# Patient Record
Sex: MALE | Race: WHITE | NOT HISPANIC OR LATINO | Employment: UNEMPLOYED | URBAN - NONMETROPOLITAN AREA
[De-identification: names, ages, dates, MRNs, and addresses within clinical notes are randomized per-mention and may not be internally consistent; named-entity substitution may affect disease eponyms.]

---
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
## 2019-02-24 ENCOUNTER — OFFICE VISIT (OUTPATIENT)
Dept: RETAIL CLINIC | Facility: CLINIC | Age: 19
End: 2019-02-24

## 2019-02-24 VITALS
DIASTOLIC BLOOD PRESSURE: 73 MMHG | SYSTOLIC BLOOD PRESSURE: 118 MMHG | BODY MASS INDEX: 23.18 KG/M2 | TEMPERATURE: 98.1 F | OXYGEN SATURATION: 97 % | WEIGHT: 166.2 LBS | HEART RATE: 67 BPM | RESPIRATION RATE: 18 BRPM

## 2019-02-24 DIAGNOSIS — R05.9 COUGH: ICD-10-CM

## 2019-02-24 DIAGNOSIS — J06.9 URI, ACUTE: Primary | ICD-10-CM

## 2019-02-24 LAB
EXPIRATION DATE: NORMAL
FLUAV AG NPH QL: NEGATIVE
FLUBV AG NPH QL: NEGATIVE
INTERNAL CONTROL: NORMAL
Lab: NORMAL

## 2019-02-24 PROCEDURE — 99213 OFFICE O/P EST LOW 20 MIN: CPT | Performed by: NURSE PRACTITIONER

## 2019-02-24 PROCEDURE — 87804 INFLUENZA ASSAY W/OPTIC: CPT | Performed by: NURSE PRACTITIONER

## 2019-02-24 RX ORDER — PREDNISONE 20 MG/1
20 TABLET ORAL DAILY
Qty: 5 TABLET | Refills: 0 | Status: SHIPPED | OUTPATIENT
Start: 2019-02-24 | End: 2019-03-01

## 2019-02-24 RX ORDER — DEXTROMETHORPHAN HYDROBROMIDE AND PROMETHAZINE HYDROCHLORIDE 15; 6.25 MG/5ML; MG/5ML
5 SYRUP ORAL EVERY 6 HOURS PRN
Qty: 100 ML | Refills: 0 | Status: SHIPPED | OUTPATIENT
Start: 2019-02-24 | End: 2019-03-01

## 2019-02-24 NOTE — PATIENT INSTRUCTIONS
Viral Respiratory Infection  A viral respiratory infection is an illness that affects parts of the body used for breathing, like the lungs, nose, and throat. It is caused by a germ called a virus.  Some examples of this kind of infection are:  · A cold.  · The flu (influenza).  · A respiratory syncytial virus (RSV) infection.    How do I know if I have this infection?  Most of the time this infection causes:  · A stuffy or runny nose.  · Yellow or green fluid in the nose.  · A cough.  · Sneezing.  · Tiredness (fatigue).  · Achy muscles.  · A sore throat.  · Sweating or chills.  · A fever.  · A headache.    How is this infection treated?  If the flu is diagnosed early, it may be treated with an antiviral medicine. This medicine shortens the length of time a person has symptoms. Symptoms may be treated with over-the-counter and prescription medicines, such as:  · Expectorants. These make it easier to cough up mucus.  · Decongestant nasal sprays.    Doctors do not prescribe antibiotic medicines for viral infections. They do not work with this kind of infection.  How do I know if I should stay home?  To keep others from getting sick, stay home if you have:  · A fever.  · A lasting cough.  · A sore throat.  · A runny nose.  · Sneezing.  · Muscles aches.  · Headaches.  · Tiredness.  · Weakness.  · Chills.  · Sweating.  · An upset stomach (nausea).    Follow these instructions at home:  · Rest as much as possible.  · Take over-the-counter and prescription medicines only as told by your doctor.  · Drink enough fluid to keep your pee (urine) clear or pale yellow.  · Gargle with salt water. Do this 3-4 times per day or as needed. To make a salt-water mixture, dissolve ½-1 tsp of salt in 1 cup of warm water. Make sure the salt dissolves all the way.  · Use nose drops made from salt water. This helps with stuffiness (congestion). It also helps soften the skin around your nose.  · Do not drink alcohol.  · Do not use tobacco  products, including cigarettes, chewing tobacco, and e-cigarettes. If you need help quitting, ask your doctor.  Get help if:  · Your symptoms last for 10 days or longer.  · Your symptoms get worse over time.  · You have a fever.  · You have very bad pain in your face or forehead.  · Parts of your jaw or neck become very swollen.  Get help right away if:  · You feel pain or pressure in your chest.  · You have shortness of breath.  · You faint or feel like you will faint.  · You keep throwing up (vomiting).  · You feel confused.  This information is not intended to replace advice given to you by your health care provider. Make sure you discuss any questions you have with your health care provider.  Document Released: 11/30/2009 Document Revised: 05/25/2017 Document Reviewed: 05/25/2016  [employer] Interactive Patient Education © 2018 Elsevier Inc.

## 2019-02-24 NOTE — PROGRESS NOTES
ROCIO@  Nash Cruz is a 19 y.o. male.   Chief Complaint   Patient presents with   • URI     rhinitis, cough with PND      URI    This is a new problem. Episode onset: 4 days. The problem has been waxing and waning. Associated symptoms include coughing (with PND), a plugged ear sensation, rhinorrhea, sinus pain, sneezing and a sore throat (days ago and resolved). Pertinent negatives include no abdominal pain, congestion, diarrhea, ear pain, headaches, nausea, rash, swollen glands, vomiting or wheezing. Treatments tried: OTC cold and cough medications. The treatment provided mild relief.      The following portions of the patient's history were reviewed and updated as appropriate: allergies, current medications, past family history, past medical history, past social history, past surgical history and problem list.    Current Outpatient Medications:   •  Atomoxetine HCl (STRATTERA PO), Take  by mouth., Disp: , Rfl:   •  ibuprofen (ADVIL,MOTRIN) 800 MG tablet, Take 1 tablet by mouth Every 6 (Six) Hours As Needed for mild pain (1-3)., Disp: 30 tablet, Rfl: 0  •  loratadine-pseudoephedrine (CLARITIN-D 12 HOUR) 5-120 MG per 12 hr tablet, Take 1 tablet by mouth 2 (Two) Times a Day., Disp: 14 tablet, Rfl: 0  •  predniSONE (DELTASONE) 20 MG tablet, Take 1 tablet by mouth Daily for 5 days., Disp: 5 tablet, Rfl: 0  •  promethazine-dextromethorphan (PROMETHAZINE-DM) 6.25-15 MG/5ML syrup, Take 5 mL by mouth Every 6 (Six) Hours As Needed for Cough for up to 5 days., Disp: 100 mL, Rfl: 0    No Known Allergies    Review of Systems   Constitutional: Negative for appetite change, chills and fever.   HENT: Positive for postnasal drip, rhinorrhea, sinus pain, sneezing and sore throat (days ago and resolved). Negative for congestion, ear pain, sinus pressure, trouble swallowing and voice change.    Respiratory: Positive for cough (with PND). Negative for chest tightness, shortness of breath and wheezing.    Gastrointestinal:  Negative for abdominal pain, diarrhea, nausea and vomiting.   Skin: Negative for color change, pallor and rash.   Allergic/Immunologic: Negative for immunocompromised state.   Neurological: Negative for dizziness and headaches.   Psychiatric/Behavioral: Negative for sleep disturbance.     Objective     Visit Vitals  /73   Pulse 67   Temp 98.1 °F (36.7 °C)   Resp 18   Wt 75.4 kg (166 lb 3.2 oz)   SpO2 97%   BMI 23.18 kg/m²     Physical Exam   Constitutional: He is oriented to person, place, and time. He appears well-developed and well-nourished.   HENT:   Head: Normocephalic.   Right Ear: Tympanic membrane is bulging. Tympanic membrane is not injected, not perforated and not erythematous.   Left Ear: Tympanic membrane is bulging. Tympanic membrane is not injected, not perforated and not erythematous.   Nose: Mucosal edema and rhinorrhea present.   Mouth/Throat: Mucous membranes are normal. No uvula swelling. Posterior oropharyngeal erythema present. No posterior oropharyngeal edema. Tonsils are 1+ on the right. Tonsils are 1+ on the left. No tonsillar exudate.   Eyes: Conjunctivae are normal.   Cardiovascular: Regular rhythm.   Pulmonary/Chest: Effort normal and breath sounds normal. He has no wheezes.   Musculoskeletal: Normal range of motion.   Lymphadenopathy:     He has no cervical adenopathy.   Neurological: He is alert and oriented to person, place, and time.   Skin: Skin is warm and dry.   Psychiatric: He has a normal mood and affect. His behavior is normal.     Lab Results (last 24 hours)     Procedure Component Value Units Date/Time    POC Influenza A / B [[id number]]  (Normal) Collected:  02/24/19 1700    Specimen:  Swab Updated:  02/24/19 1701     Rapid Influenza A Ag Negative     Rapid Influenza B Ag Negative     Internal Control Passed     Lot Number [id number]     Expiration Date 9/30/20        Assessment/Plan   Nash was seen today for uri.    Diagnoses and all orders for this visit:    URI,  acute  -     predniSONE (DELTASONE) 20 MG tablet; Take 1 tablet by mouth Daily for 5 days.  -     promethazine-dextromethorphan (PROMETHAZINE-DM) 6.25-15 MG/5ML syrup; Take 5 mL by mouth Every 6 (Six) Hours As Needed for Cough for up to 5 days.    Cough  -     POC Influenza A / B               Discussed results of the POC flu screen negative. Discussed impression and treatment. Patient to hold the Strattera while taking the above prescribed medications. Reports taking Strattera while in school only. AVS reviewed with patient, understanding verbalized and agrees with treatment plan.  Follow up with primary care provider if no improvement within next 7-10 days. Go to [facility] or ER if condition worsens.

## 2021-03-29 ENCOUNTER — HOSPITAL ENCOUNTER (EMERGENCY)
Facility: HOSPITAL | Age: 21
Discharge: HOME | End: 2021-03-29
Attending: FAMILY MEDICINE | Admitting: FAMILY MEDICINE

## 2021-03-29 ENCOUNTER — APPOINTMENT (OUTPATIENT)
Dept: CT IMAGING | Facility: HOSPITAL | Age: 21
End: 2021-03-29

## 2021-03-29 VITALS
SYSTOLIC BLOOD PRESSURE: 139 MMHG | HEIGHT: 72 IN | DIASTOLIC BLOOD PRESSURE: 89 MMHG | HEART RATE: 58 BPM | WEIGHT: 170 LBS | BODY MASS INDEX: 23.03 KG/M2 | RESPIRATION RATE: 16 BRPM | TEMPERATURE: 97.8 F | OXYGEN SATURATION: 98 %

## 2021-03-29 DIAGNOSIS — M43.16 SPONDYLOLISTHESIS OF LUMBAR REGION: Primary | ICD-10-CM

## 2021-03-29 DIAGNOSIS — S39.012A BACK STRAIN, INITIAL ENCOUNTER: ICD-10-CM

## 2021-03-29 PROCEDURE — 99283 EMERGENCY DEPT VISIT LOW MDM: CPT

## 2021-03-29 PROCEDURE — 72131 CT LUMBAR SPINE W/O DYE: CPT

## 2021-03-29 PROCEDURE — 63710000001 PREDNISONE PER 1 MG: Performed by: PHYSICIAN ASSISTANT

## 2021-03-29 PROCEDURE — 72131 CT LUMBAR SPINE W/O DYE: CPT | Performed by: RADIOLOGY

## 2021-03-29 RX ORDER — PREDNISONE 20 MG/1
20 TABLET ORAL ONCE
Status: COMPLETED | OUTPATIENT
Start: 2021-03-29 | End: 2021-03-29

## 2021-03-29 RX ORDER — PREDNISONE 20 MG/1
20 TABLET ORAL 3 TIMES DAILY
Qty: 15 TABLET | Refills: 0 | Status: SHIPPED | OUTPATIENT
Start: 2021-03-29 | End: 2021-04-03

## 2021-03-29 RX ORDER — INDOMETHACIN 25 MG/1
25 CAPSULE ORAL 3 TIMES DAILY PRN
Qty: 15 CAPSULE | Refills: 0 | Status: SHIPPED | OUTPATIENT
Start: 2021-03-29

## 2021-03-29 RX ORDER — INDOMETHACIN 25 MG/1
50 CAPSULE ORAL ONCE
Status: COMPLETED | OUTPATIENT
Start: 2021-03-29 | End: 2021-03-29

## 2021-03-29 RX ORDER — ORPHENADRINE CITRATE 100 MG/1
100 TABLET, EXTENDED RELEASE ORAL 2 TIMES DAILY
Qty: 20 TABLET | Refills: 0 | Status: SHIPPED | OUTPATIENT
Start: 2021-03-29

## 2021-03-29 RX ADMIN — INDOMETHACIN 50 MG: 25 CAPSULE ORAL at 19:59

## 2021-03-29 RX ADMIN — PREDNISONE 20 MG: 20 TABLET ORAL at 19:59

## 2024-05-02 ENCOUNTER — HOSPITAL ENCOUNTER (EMERGENCY)
Facility: HOSPITAL | Age: 24
Discharge: HOME | End: 2024-05-02
Attending: EMERGENCY MEDICINE
Payer: COMMERCIAL

## 2024-05-02 ENCOUNTER — APPOINTMENT (OUTPATIENT)
Dept: GENERAL RADIOLOGY | Facility: HOSPITAL | Age: 24
End: 2024-05-02
Payer: COMMERCIAL

## 2024-05-02 VITALS
TEMPERATURE: 98.5 F | HEIGHT: 71 IN | OXYGEN SATURATION: 99 % | DIASTOLIC BLOOD PRESSURE: 56 MMHG | HEART RATE: 70 BPM | BODY MASS INDEX: 22.4 KG/M2 | SYSTOLIC BLOOD PRESSURE: 115 MMHG | WEIGHT: 160 LBS | RESPIRATION RATE: 17 BRPM
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
## 2024-05-02 PROCEDURE — 99283 EMERGENCY DEPT VISIT LOW MDM: CPT

## 2024-05-02 PROCEDURE — 71045 X-RAY EXAM CHEST 1 VIEW: CPT | Performed by: RADIOLOGY

## 2024-05-02 PROCEDURE — 73030 X-RAY EXAM OF SHOULDER: CPT | Performed by: RADIOLOGY

## 2024-05-02 PROCEDURE — 73030 X-RAY EXAM OF SHOULDER: CPT

## 2024-05-02 PROCEDURE — 71045 X-RAY EXAM CHEST 1 VIEW: CPT

## 2024-05-02 RX ORDER — IBUPROFEN 600 MG/1
600 TABLET ORAL 3 TIMES DAILY
Qty: 21 TABLET | Refills: 0 | Status: SHIPPED | OUTPATIENT
Start: 2024-05-02
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
